# Patient Record
Sex: MALE | Race: WHITE | NOT HISPANIC OR LATINO | URBAN - METROPOLITAN AREA
[De-identification: names, ages, dates, MRNs, and addresses within clinical notes are randomized per-mention and may not be internally consistent; named-entity substitution may affect disease eponyms.]

---

## 2017-02-02 ENCOUNTER — EMERGENCY (EMERGENCY)
Facility: HOSPITAL | Age: 32
LOS: 1 days | Discharge: HOME | End: 2017-02-02
Attending: EMERGENCY MEDICINE | Admitting: EMERGENCY MEDICINE
Payer: COMMERCIAL

## 2017-02-02 VITALS
RESPIRATION RATE: 18 BRPM | OXYGEN SATURATION: 95 % | HEIGHT: 71 IN | HEART RATE: 97 BPM | WEIGHT: 169.98 LBS | TEMPERATURE: 98 F | DIASTOLIC BLOOD PRESSURE: 81 MMHG | SYSTOLIC BLOOD PRESSURE: 148 MMHG

## 2017-02-02 DIAGNOSIS — L03.116 CELLULITIS OF LEFT LOWER LIMB: ICD-10-CM

## 2017-02-02 LAB
ALBUMIN SERPL ELPH-MCNC: 3.9 G/DL — SIGNIFICANT CHANGE UP (ref 3.3–5)
ALP SERPL-CCNC: 57 U/L — SIGNIFICANT CHANGE UP (ref 40–120)
ALT FLD-CCNC: 99 U/L — HIGH (ref 12–78)
ANION GAP SERPL CALC-SCNC: 8 MMOL/L — SIGNIFICANT CHANGE UP (ref 5–17)
AST SERPL-CCNC: 126 U/L — HIGH (ref 15–37)
BASOPHILS # BLD AUTO: 0.1 K/UL — SIGNIFICANT CHANGE UP (ref 0–0.2)
BASOPHILS NFR BLD AUTO: 1 % — SIGNIFICANT CHANGE UP (ref 0–2)
BILIRUB SERPL-MCNC: 0.5 MG/DL — SIGNIFICANT CHANGE UP (ref 0.2–1.2)
BUN SERPL-MCNC: 11 MG/DL — SIGNIFICANT CHANGE UP (ref 7–23)
CALCIUM SERPL-MCNC: 8.4 MG/DL — LOW (ref 8.5–10.1)
CHLORIDE SERPL-SCNC: 108 MMOL/L — SIGNIFICANT CHANGE UP (ref 96–108)
CO2 SERPL-SCNC: 28 MMOL/L — SIGNIFICANT CHANGE UP (ref 22–31)
CREAT SERPL-MCNC: 0.95 MG/DL — SIGNIFICANT CHANGE UP (ref 0.5–1.3)
EOSINOPHIL # BLD AUTO: 0.2 K/UL — SIGNIFICANT CHANGE UP (ref 0–0.5)
EOSINOPHIL NFR BLD AUTO: 3.5 % — SIGNIFICANT CHANGE UP (ref 0–6)
GLUCOSE SERPL-MCNC: 102 MG/DL — HIGH (ref 70–99)
HCT VFR BLD CALC: 42.5 % — SIGNIFICANT CHANGE UP (ref 39–50)
HGB BLD-MCNC: 13.9 G/DL — SIGNIFICANT CHANGE UP (ref 13–17)
LYMPHOCYTES # BLD AUTO: 1.5 K/UL — SIGNIFICANT CHANGE UP (ref 1–3.3)
LYMPHOCYTES # BLD AUTO: 26.6 % — SIGNIFICANT CHANGE UP (ref 13–44)
MCHC RBC-ENTMCNC: 31.9 PG — SIGNIFICANT CHANGE UP (ref 27–34)
MCHC RBC-ENTMCNC: 32.7 GM/DL — SIGNIFICANT CHANGE UP (ref 32–36)
MCV RBC AUTO: 97.6 FL — SIGNIFICANT CHANGE UP (ref 80–100)
MONOCYTES # BLD AUTO: 0.4 K/UL — SIGNIFICANT CHANGE UP (ref 0–0.9)
MONOCYTES NFR BLD AUTO: 7.1 % — SIGNIFICANT CHANGE UP (ref 2–14)
NEUTROPHILS # BLD AUTO: 3.4 K/UL — SIGNIFICANT CHANGE UP (ref 1.8–7.4)
NEUTROPHILS NFR BLD AUTO: 61.8 % — SIGNIFICANT CHANGE UP (ref 43–77)
PLATELET # BLD AUTO: 248 K/UL — SIGNIFICANT CHANGE UP (ref 150–400)
POTASSIUM SERPL-MCNC: 4.5 MMOL/L — SIGNIFICANT CHANGE UP (ref 3.5–5.3)
POTASSIUM SERPL-SCNC: 4.5 MMOL/L — SIGNIFICANT CHANGE UP (ref 3.5–5.3)
PROT SERPL-MCNC: 7.1 GM/DL — SIGNIFICANT CHANGE UP (ref 6–8.3)
RBC # BLD: 4.35 M/UL — SIGNIFICANT CHANGE UP (ref 4.2–5.8)
RBC # FLD: 12.4 % — SIGNIFICANT CHANGE UP (ref 10.3–14.5)
SODIUM SERPL-SCNC: 144 MMOL/L — SIGNIFICANT CHANGE UP (ref 135–145)
WBC # BLD: 5.6 K/UL — SIGNIFICANT CHANGE UP (ref 3.8–10.5)
WBC # FLD AUTO: 5.6 K/UL — SIGNIFICANT CHANGE UP (ref 3.8–10.5)

## 2017-02-02 PROCEDURE — 99284 EMERGENCY DEPT VISIT MOD MDM: CPT

## 2017-02-02 RX ORDER — SODIUM CHLORIDE 9 MG/ML
1000 INJECTION INTRAMUSCULAR; INTRAVENOUS; SUBCUTANEOUS ONCE
Qty: 0 | Refills: 0 | Status: COMPLETED | OUTPATIENT
Start: 2017-02-02 | End: 2017-02-02

## 2017-02-02 RX ADMIN — SODIUM CHLORIDE 1000 MILLILITER(S): 9 INJECTION INTRAMUSCULAR; INTRAVENOUS; SUBCUTANEOUS at 14:17

## 2017-02-02 RX ADMIN — Medication 100 MILLIGRAM(S): at 14:18
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
## 2017-02-02 NOTE — ED STATDOCS - NS ED MD SCRIBE ATTENDING SCRIBE SECTIONS
HISTORY OF PRESENT ILLNESS/DISPOSITION/PAST MEDICAL/SURGICAL/SOCIAL HISTORY/PHYSICAL EXAM/REVIEW OF SYSTEMS

## 2017-02-02 NOTE — ED STATDOCS - SKIN, MLM
Cellulitis of left calf with 3 areas of ulceration, warmth and mild tenderness. No spontaneous discharge, no fluctuance, no induration.

## 2017-02-02 NOTE — ED STATDOCS - ATTENDING CONTRIBUTION TO CARE
Agree with PA progress note    The scribe's documentation has been prepared under my direction and personally reviewed by me in its entirety. I confirm that the note above accurately reflects all work, treatment, procedures, and medical decision making performed by me.
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
## 2017-03-19 ENCOUNTER — INPATIENT (INPATIENT)
Facility: HOSPITAL | Age: 32
LOS: 0 days | Discharge: HOME | End: 2017-03-20
Attending: INTERNAL MEDICINE | Admitting: INTERNAL MEDICINE
Payer: COMMERCIAL

## 2017-03-19 VITALS
HEIGHT: 70 IN | WEIGHT: 175.05 LBS | SYSTOLIC BLOOD PRESSURE: 143 MMHG | DIASTOLIC BLOOD PRESSURE: 81 MMHG | RESPIRATION RATE: 18 BRPM | TEMPERATURE: 98 F | OXYGEN SATURATION: 98 % | HEART RATE: 115 BPM

## 2017-03-19 DIAGNOSIS — R56.9 UNSPECIFIED CONVULSIONS: ICD-10-CM

## 2017-03-19 DIAGNOSIS — Z98.890 OTHER SPECIFIED POSTPROCEDURAL STATES: Chronic | ICD-10-CM

## 2017-03-19 DIAGNOSIS — F11.10 OPIOID ABUSE, UNCOMPLICATED: ICD-10-CM

## 2017-03-19 DIAGNOSIS — L03.116 CELLULITIS OF LEFT LOWER LIMB: ICD-10-CM

## 2017-03-19 DIAGNOSIS — M79.89 OTHER SPECIFIED SOFT TISSUE DISORDERS: ICD-10-CM

## 2017-03-19 LAB
ALBUMIN SERPL ELPH-MCNC: 3.8 G/DL — SIGNIFICANT CHANGE UP (ref 3.3–5)
ALP SERPL-CCNC: 59 U/L — SIGNIFICANT CHANGE UP (ref 40–120)
ALT FLD-CCNC: 89 U/L — HIGH (ref 12–78)
ANION GAP SERPL CALC-SCNC: 8 MMOL/L — SIGNIFICANT CHANGE UP (ref 5–17)
AST SERPL-CCNC: 73 U/L — HIGH (ref 15–37)
BASOPHILS # BLD AUTO: 0.7 K/UL — HIGH (ref 0–0.2)
BASOPHILS NFR BLD AUTO: 1.1 % — SIGNIFICANT CHANGE UP (ref 0–2)
BILIRUB SERPL-MCNC: 0.7 MG/DL — SIGNIFICANT CHANGE UP (ref 0.2–1.2)
BUN SERPL-MCNC: 14 MG/DL — SIGNIFICANT CHANGE UP (ref 7–23)
CALCIUM SERPL-MCNC: 8.4 MG/DL — LOW (ref 8.5–10.1)
CHLORIDE SERPL-SCNC: 109 MMOL/L — HIGH (ref 96–108)
CO2 SERPL-SCNC: 26 MMOL/L — SIGNIFICANT CHANGE UP (ref 22–31)
CREAT SERPL-MCNC: 0.89 MG/DL — SIGNIFICANT CHANGE UP (ref 0.5–1.3)
EOSINOPHIL # BLD AUTO: 0.2 K/UL — SIGNIFICANT CHANGE UP (ref 0–0.5)
EOSINOPHIL NFR BLD AUTO: 3.8 % — SIGNIFICANT CHANGE UP (ref 0–6)
GLUCOSE SERPL-MCNC: 108 MG/DL — HIGH (ref 70–99)
HCT VFR BLD CALC: 45.1 % — SIGNIFICANT CHANGE UP (ref 39–50)
HGB BLD-MCNC: 14.6 G/DL — SIGNIFICANT CHANGE UP (ref 13–17)
LACTATE SERPL-SCNC: 2 MMOL/L — SIGNIFICANT CHANGE UP (ref 0.7–2)
LYMPHOCYTES # BLD AUTO: 1.4 K/UL — SIGNIFICANT CHANGE UP (ref 1–3.3)
LYMPHOCYTES # BLD AUTO: 23.2 % — SIGNIFICANT CHANGE UP (ref 13–44)
MANUAL DIF COMMENT BLD-IMP: SIGNIFICANT CHANGE UP
MCHC RBC-ENTMCNC: 31.2 PG — SIGNIFICANT CHANGE UP (ref 27–34)
MCHC RBC-ENTMCNC: 32.3 GM/DL — SIGNIFICANT CHANGE UP (ref 32–36)
MCV RBC AUTO: 96.6 FL — SIGNIFICANT CHANGE UP (ref 80–100)
MONOCYTES # BLD AUTO: 0.4 K/UL — SIGNIFICANT CHANGE UP (ref 0–0.9)
MONOCYTES NFR BLD AUTO: 6.3 % — SIGNIFICANT CHANGE UP (ref 2–14)
NEUTROPHILS # BLD AUTO: 3.9 K/UL — SIGNIFICANT CHANGE UP (ref 1.8–7.4)
NEUTROPHILS NFR BLD AUTO: 65.6 % — SIGNIFICANT CHANGE UP (ref 43–77)
PLAT MORPH BLD: NORMAL — SIGNIFICANT CHANGE UP
PLATELET # BLD AUTO: 57 K/UL — LOW (ref 150–400)
POTASSIUM SERPL-MCNC: 4.3 MMOL/L — SIGNIFICANT CHANGE UP (ref 3.5–5.3)
POTASSIUM SERPL-SCNC: 4.3 MMOL/L — SIGNIFICANT CHANGE UP (ref 3.5–5.3)
PROT SERPL-MCNC: 7 GM/DL — SIGNIFICANT CHANGE UP (ref 6–8.3)
RBC # BLD: 4.67 M/UL — SIGNIFICANT CHANGE UP (ref 4.2–5.8)
RBC # FLD: 12 % — SIGNIFICANT CHANGE UP (ref 10.3–14.5)
RBC BLD AUTO: NORMAL — SIGNIFICANT CHANGE UP
SODIUM SERPL-SCNC: 143 MMOL/L — SIGNIFICANT CHANGE UP (ref 135–145)
WBC # BLD: 6 K/UL — SIGNIFICANT CHANGE UP (ref 3.8–10.5)
WBC # FLD AUTO: 6 K/UL — SIGNIFICANT CHANGE UP (ref 3.8–10.5)

## 2017-03-19 PROCEDURE — 71020: CPT | Mod: 26

## 2017-03-19 PROCEDURE — 93971 EXTREMITY STUDY: CPT | Mod: 26,LT

## 2017-03-19 PROCEDURE — 99285 EMERGENCY DEPT VISIT HI MDM: CPT

## 2017-03-19 PROCEDURE — 93010 ELECTROCARDIOGRAM REPORT: CPT

## 2017-03-19 RX ORDER — VANCOMYCIN HCL 1 G
1000 VIAL (EA) INTRAVENOUS EVERY 12 HOURS
Qty: 0 | Refills: 0 | Status: DISCONTINUED | OUTPATIENT
Start: 2017-03-19 | End: 2017-03-20

## 2017-03-19 RX ORDER — FOLIC ACID 0.8 MG
1 TABLET ORAL DAILY
Qty: 0 | Refills: 0 | Status: DISCONTINUED | OUTPATIENT
Start: 2017-03-19 | End: 2017-03-20

## 2017-03-19 RX ORDER — BUPRENORPHINE AND NALOXONE 2; .5 MG/1; MG/1
0 TABLET SUBLINGUAL
Qty: 0 | Refills: 0 | COMMUNITY

## 2017-03-19 RX ORDER — SODIUM CHLORIDE 9 MG/ML
1000 INJECTION INTRAMUSCULAR; INTRAVENOUS; SUBCUTANEOUS ONCE
Qty: 0 | Refills: 0 | Status: COMPLETED | OUTPATIENT
Start: 2017-03-19 | End: 2017-03-19

## 2017-03-19 RX ORDER — ZONISAMIDE 100 MG
400 CAPSULE ORAL AT BEDTIME
Qty: 0 | Refills: 0 | Status: DISCONTINUED | OUTPATIENT
Start: 2017-03-19 | End: 2017-03-20

## 2017-03-19 RX ORDER — TETANUS TOXOID, REDUCED DIPHTHERIA TOXOID AND ACELLULAR PERTUSSIS VACCINE, ADSORBED 5; 2.5; 8; 8; 2.5 [IU]/.5ML; [IU]/.5ML; UG/.5ML; UG/.5ML; UG/.5ML
0.5 SUSPENSION INTRAMUSCULAR ONCE
Qty: 0 | Refills: 0 | Status: COMPLETED | OUTPATIENT
Start: 2017-03-19 | End: 2017-03-19

## 2017-03-19 RX ORDER — BUPRENORPHINE AND NALOXONE 2; .5 MG/1; MG/1
1 TABLET SUBLINGUAL DAILY
Qty: 0 | Refills: 0 | Status: DISCONTINUED | OUTPATIENT
Start: 2017-03-19 | End: 2017-03-20

## 2017-03-19 RX ORDER — ACETAMINOPHEN 500 MG
650 TABLET ORAL EVERY 6 HOURS
Qty: 0 | Refills: 0 | Status: DISCONTINUED | OUTPATIENT
Start: 2017-03-19 | End: 2017-03-20

## 2017-03-19 RX ORDER — ZONISAMIDE 100 MG
0 CAPSULE ORAL
Qty: 0 | Refills: 0 | COMMUNITY

## 2017-03-19 RX ORDER — VANCOMYCIN HCL 1 G
1000 VIAL (EA) INTRAVENOUS ONCE
Qty: 0 | Refills: 0 | Status: COMPLETED | OUTPATIENT
Start: 2017-03-19 | End: 2017-03-19

## 2017-03-19 RX ORDER — HEPARIN SODIUM 5000 [USP'U]/ML
5000 INJECTION INTRAVENOUS; SUBCUTANEOUS EVERY 12 HOURS
Qty: 0 | Refills: 0 | Status: DISCONTINUED | OUTPATIENT
Start: 2017-03-19 | End: 2017-03-20

## 2017-03-19 RX ORDER — PIPERACILLIN AND TAZOBACTAM 4; .5 G/20ML; G/20ML
3.38 INJECTION, POWDER, LYOPHILIZED, FOR SOLUTION INTRAVENOUS ONCE
Qty: 0 | Refills: 0 | Status: COMPLETED | OUTPATIENT
Start: 2017-03-19 | End: 2017-03-19

## 2017-03-19 RX ORDER — ONDANSETRON 8 MG/1
4 TABLET, FILM COATED ORAL EVERY 6 HOURS
Qty: 0 | Refills: 0 | Status: DISCONTINUED | OUTPATIENT
Start: 2017-03-19 | End: 2017-03-20

## 2017-03-19 RX ADMIN — SODIUM CHLORIDE 1000 MILLILITER(S): 9 INJECTION INTRAMUSCULAR; INTRAVENOUS; SUBCUTANEOUS at 13:30

## 2017-03-19 RX ADMIN — PIPERACILLIN AND TAZOBACTAM 200 GRAM(S): 4; .5 INJECTION, POWDER, LYOPHILIZED, FOR SOLUTION INTRAVENOUS at 14:30

## 2017-03-19 RX ADMIN — Medication 250 MILLIGRAM(S): at 15:30

## 2017-03-19 RX ADMIN — Medication 400 MILLIGRAM(S): at 22:24

## 2017-03-19 RX ADMIN — TETANUS TOXOID, REDUCED DIPHTHERIA TOXOID AND ACELLULAR PERTUSSIS VACCINE, ADSORBED 0.5 MILLILITER(S): 5; 2.5; 8; 8; 2.5 SUSPENSION INTRAMUSCULAR at 16:05

## 2017-03-19 RX ADMIN — HEPARIN SODIUM 5000 UNIT(S): 5000 INJECTION INTRAVENOUS; SUBCUTANEOUS at 22:25

## 2017-03-19 NOTE — ED STATDOCS - NS ED MD SCRIBE ATTENDING SCRIBE SECTIONS
REVIEW OF SYSTEMS/DISPOSITION/PAST MEDICAL/SURGICAL/SOCIAL HISTORY/RESULTS/HISTORY OF PRESENT ILLNESS/PROGRESS NOTE/PHYSICAL EXAM

## 2017-03-19 NOTE — ED STATDOCS - DETAILS:
Dr. Menon: I performed the initial face to face bedside interview with this patient regarding history of present illness, review of symptoms and past medical, social and family history.  I completed an independent physical examination.  I was the initial provider who evaluated this patient.  The history, review of symptoms and examination was documented by the scribe in my presence and I attest to the accuracy of the documentation.  I have signed out the follow up of any pending tests (i.e. labs, radiological studies) to the ACP.  I have discussed the patient’s plan of care and disposition with the ACP.

## 2017-03-19 NOTE — H&P ADULT - ASSESSMENT
30yo M benign brain lesion s/p resection c/b recurrent seizures, MRSA infection of L knee, IVDA on Suboxone admitted with nonresolving cellulitis s/p PO antibiotics

## 2017-03-19 NOTE — PROVIDER CONTACT NOTE (OTHER) - SITUATION
patient states he drinks vodka daily 4-5 drinks/day. states he has previously received ativan during prior hospitalizations.

## 2017-03-19 NOTE — H&P ADULT - NSHPPHYSICALEXAM_GEN_ALL_CORE
Vital Signs Last 24 Hrs  T(C): 36.7, Max: 36.7 (03-19 @ 12:03)  T(F): 98.1, Max: 98.1 (03-19 @ 12:03)  HR: 115 (115 - 115)  BP: 143/81 (143/81 - 143/81)  BP(mean): --  RR: 18 (18 - 18)  SpO2: 98% (98% - 98%)    Gen: AAOx3, NAD, peculiar affect  HEENT: NCAT, EOMI  Neck: Supple  CV: nml S1S2, RRR  Lungs: CTABL  Abd: Soft, NT, ND, BS+  Ext: multiple excoriations anterior/lateral/posterior aspect of left calf, no purulent drainage, dry; warmth, erythema, nonpitting edema of left ankle  Neuro: Non focal

## 2017-03-19 NOTE — CHART NOTE - NSCHARTNOTEFT_GEN_A_CORE
Notified by RN that the patient drinks 4 vodka daily currently he is not scoring on CIWA protcol.Will place on Low dose CIWA protocol now.    D/W with Dr Hay

## 2017-03-19 NOTE — H&P ADULT - PROBLEM SELECTOR PLAN 2
- ambulating without difficulty, nontender to palpation  - edema associated with cellulitis  - doppler negative for DVT  - check ankle XR

## 2017-03-19 NOTE — H&P ADULT - NSHPLABSRESULTS_GEN_ALL_CORE
14.6   6.0   )-----------( 57       ( 19 Mar 2017 14:24 )             45.1     Lactate, Blood: 2.0 mmol/L (03-19 @ 14:24)

## 2017-03-19 NOTE — H&P ADULT - NSHPREVIEWOFSYSTEMS_GEN_ALL_CORE
(-)Fever, chills, cough, chest pain, sob, headache, dizziness, palpitations, abd pain, n/v/d, difficulty walking  (+) left ankle edema, leg itching, tenderness

## 2017-03-19 NOTE — H&P ADULT - PROBLEM SELECTOR PLAN 4
- seizure disorder after craniectomy for benign brain lesion 5 years ago  - c/w zonisamide 400mg at bedtime
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
## 2017-03-19 NOTE — H&P ADULT - PROBLEM SELECTOR PLAN 1
- initial improvement on clindamycin, but now worsening  - vancomycin IV due to MRSA RF's  - nondiabetic, no significant RF's for GN coverage  - ID consultation  - SW consultation as will likely need long term IV Abx
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
## 2017-03-20 VITALS
TEMPERATURE: 98 F | SYSTOLIC BLOOD PRESSURE: 126 MMHG | DIASTOLIC BLOOD PRESSURE: 85 MMHG | HEART RATE: 62 BPM | RESPIRATION RATE: 20 BRPM

## 2017-03-20 LAB
ALBUMIN SERPL ELPH-MCNC: 3.5 G/DL — SIGNIFICANT CHANGE UP (ref 3.3–5)
ALP SERPL-CCNC: 56 U/L — SIGNIFICANT CHANGE UP (ref 40–120)
ALT FLD-CCNC: 75 U/L — SIGNIFICANT CHANGE UP (ref 12–78)
ANION GAP SERPL CALC-SCNC: 9 MMOL/L — SIGNIFICANT CHANGE UP (ref 5–17)
AST SERPL-CCNC: 54 U/L — HIGH (ref 15–37)
BILIRUB DIRECT SERPL-MCNC: 0.3 MG/DL — HIGH (ref 0–0.2)
BILIRUB INDIRECT FLD-MCNC: 1.2 MG/DL — HIGH (ref 0.2–1)
BILIRUB SERPL-MCNC: 1.5 MG/DL — HIGH (ref 0.2–1.2)
BUN SERPL-MCNC: 12 MG/DL — SIGNIFICANT CHANGE UP (ref 7–23)
CALCIUM SERPL-MCNC: 8.6 MG/DL — SIGNIFICANT CHANGE UP (ref 8.5–10.1)
CHLORIDE SERPL-SCNC: 108 MMOL/L — SIGNIFICANT CHANGE UP (ref 96–108)
CO2 SERPL-SCNC: 25 MMOL/L — SIGNIFICANT CHANGE UP (ref 22–31)
CREAT SERPL-MCNC: 0.76 MG/DL — SIGNIFICANT CHANGE UP (ref 0.5–1.3)
GLUCOSE SERPL-MCNC: 103 MG/DL — HIGH (ref 70–99)
MAGNESIUM SERPL-MCNC: 1.7 MG/DL — LOW (ref 1.8–2.4)
PHOSPHATE SERPL-MCNC: 2.2 MG/DL — LOW (ref 2.5–4.5)
POTASSIUM SERPL-MCNC: 4.2 MMOL/L — SIGNIFICANT CHANGE UP (ref 3.5–5.3)
POTASSIUM SERPL-SCNC: 4.2 MMOL/L — SIGNIFICANT CHANGE UP (ref 3.5–5.3)
PROT SERPL-MCNC: 6.7 GM/DL — SIGNIFICANT CHANGE UP (ref 6–8.3)
SODIUM SERPL-SCNC: 142 MMOL/L — SIGNIFICANT CHANGE UP (ref 135–145)

## 2017-03-20 PROCEDURE — 93010 ELECTROCARDIOGRAM REPORT: CPT

## 2017-03-20 RX ORDER — SODIUM,POTASSIUM PHOSPHATES 278-250MG
1 POWDER IN PACKET (EA) ORAL
Qty: 0 | Refills: 0 | Status: DISCONTINUED | OUTPATIENT
Start: 2017-03-20 | End: 2017-03-20

## 2017-03-20 RX ORDER — AMPICILLIN SODIUM AND SULBACTAM SODIUM 250; 125 MG/ML; MG/ML
3 INJECTION, POWDER, FOR SUSPENSION INTRAMUSCULAR; INTRAVENOUS EVERY 6 HOURS
Qty: 0 | Refills: 0 | Status: DISCONTINUED | OUTPATIENT
Start: 2017-03-20 | End: 2017-03-20

## 2017-03-20 RX ORDER — BUPRENORPHINE AND NALOXONE 2; .5 MG/1; MG/1
1 TABLET SUBLINGUAL DAILY
Qty: 0 | Refills: 0 | Status: DISCONTINUED | OUTPATIENT
Start: 2017-03-20 | End: 2017-03-20

## 2017-03-20 RX ORDER — AMPICILLIN SODIUM AND SULBACTAM SODIUM 250; 125 MG/ML; MG/ML
INJECTION, POWDER, FOR SUSPENSION INTRAMUSCULAR; INTRAVENOUS
Qty: 0 | Refills: 0 | Status: DISCONTINUED | OUTPATIENT
Start: 2017-03-20 | End: 2017-03-20

## 2017-03-20 RX ORDER — MAGNESIUM OXIDE 400 MG ORAL TABLET 241.3 MG
1 TABLET ORAL
Qty: 12 | Refills: 0 | OUTPATIENT
Start: 2017-03-20 | End: 2017-03-24

## 2017-03-20 RX ORDER — AMPICILLIN SODIUM AND SULBACTAM SODIUM 250; 125 MG/ML; MG/ML
3 INJECTION, POWDER, FOR SUSPENSION INTRAMUSCULAR; INTRAVENOUS ONCE
Qty: 0 | Refills: 0 | Status: COMPLETED | OUTPATIENT
Start: 2017-03-20 | End: 2017-03-20

## 2017-03-20 RX ORDER — AZTREONAM 2 G
2 VIAL (EA) INJECTION
Qty: 28 | Refills: 0 | OUTPATIENT
Start: 2017-03-20 | End: 2017-03-27

## 2017-03-20 RX ORDER — MAGNESIUM OXIDE 400 MG ORAL TABLET 241.3 MG
400 TABLET ORAL
Qty: 0 | Refills: 0 | Status: DISCONTINUED | OUTPATIENT
Start: 2017-03-20 | End: 2017-03-20

## 2017-03-20 RX ADMIN — BUPRENORPHINE AND NALOXONE 1 FILM(S): 2; .5 TABLET SUBLINGUAL at 12:49

## 2017-03-20 RX ADMIN — Medication 1 TABLET(S): at 11:58

## 2017-03-20 RX ADMIN — BUPRENORPHINE AND NALOXONE 1 FILM(S): 2; .5 TABLET SUBLINGUAL at 12:50

## 2017-03-20 RX ADMIN — Medication 1 MILLIGRAM(S): at 11:58

## 2017-03-20 RX ADMIN — Medication 250 MILLIGRAM(S): at 06:38

## 2017-03-20 RX ADMIN — AMPICILLIN SODIUM AND SULBACTAM SODIUM 200 GRAM(S): 250; 125 INJECTION, POWDER, FOR SUSPENSION INTRAMUSCULAR; INTRAVENOUS at 16:31

## 2017-03-20 RX ADMIN — AMPICILLIN SODIUM AND SULBACTAM SODIUM 200 GRAM(S): 250; 125 INJECTION, POWDER, FOR SUSPENSION INTRAMUSCULAR; INTRAVENOUS at 11:58

## 2017-03-20 RX ADMIN — MAGNESIUM OXIDE 400 MG ORAL TABLET 400 MILLIGRAM(S): 241.3 TABLET ORAL at 11:58

## 2017-03-20 RX ADMIN — MAGNESIUM OXIDE 400 MG ORAL TABLET 400 MILLIGRAM(S): 241.3 TABLET ORAL at 16:35

## 2017-03-20 RX ADMIN — HEPARIN SODIUM 5000 UNIT(S): 5000 INJECTION INTRAVENOUS; SUBCUTANEOUS at 05:55

## 2017-03-20 RX ADMIN — Medication 1 TABLET(S): at 16:35

## 2017-03-20 RX ADMIN — Medication 250 MILLIGRAM(S): at 17:22
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
## 2017-03-20 NOTE — DISCHARGE NOTE ADULT - NS AS ACTIVITY OBS
Bathing allowed/Walking-Outdoors allowed/Driving allowed/Return to Work/School allowed/Showering allowed/Stairs allowed/Walking-Indoors allowed/Sex allowed

## 2017-03-20 NOTE — DISCHARGE NOTE ADULT - PATIENT PORTAL LINK FT
“You can access the Follow[employer] Patient Portal, offered by [facility], by registering with the following website: [email or web address]”

## 2017-03-20 NOTE — DISCHARGE NOTE ADULT - HOSPITAL COURSE
30yo M w/ PMH of benign brain lesion s/p resection , recurrent seizures (takes zonisamide) , hx of IVDA on Suboxone, ongoing ETOH abuse and dependence (daily vodka ) ,  hx of MRSA infection of L knee 2 months ago, recent cellulitis of the left leg aprox 4 weeks ago was given Clindamycin for 2 weeks now w/ recurrent cellulitis of the left leg.   Was started on Vancomycin with rapid improvement in his condition. after 24 hours of therapy, the erythema below the knee is completely resolved. Some mild edema around the ankle persists. Patient is advised to keep left leg elevated  when not ambulating.  Patient seen by Infectious disease whom concur with the findings of resolution of the left leg cellulitis. Patient will be discharged on oral bactrim DS for 7 days. Advised to f/u with outpatiet pcp w/ in one week. Educated on ETOH. He currently has no signs of withdrawl and is alert and oriented and verbalizes understanding of the plan.     Other issues:   1- mild hypomagnesemia- supplemented with oral max ox , patient will continue for another 4 days at home. prescription sent.    2-mild hypophosphatemia- supplemented   3-Transaminitis- likley secondary to his ongoing ETOH abuse. Educated.     Patient is currently alert and oriented, has no complaints and is medically cleared for discharge.      T(F): 97.6, Max: 98.5 (03-19 @ 19:35)  HR: 62 (56 - 65)  BP: 126/85 (95/66 - 134/82)  RR: 20 (15 - 20)  SpO2: 99% (99% - 100%)  Wt(kg): --      Physical exam:    HEENT:   pupils equal and reactive, EOMI, no oropharyngeal lesions, erythema, exudates, oral thrush    NECK:   supple, no carotid bruits, no palpable lymph nodes, no thyromegaly    CV:  +S1, +S2, regular, no murmurs or rubs    RESP:   lungs clear to auscultation bilaterally, no wheezing, rales, rhonchi, good air entry bilaterally    BREAST:  not examined    GI:  abdomen soft, non-tender, non-distended, normal BS, no bruits, no abdominal masses, no palpable masses    RECTAL:  not examined    :  not examined    MSK:   normal muscle tone, no atrophy, no rigidity, no contractions    EXT:   no clubbing, no cyanosis,  no calf pain, swelling or erythema; left ankle has mild edema.    VASCULAR:  pulses equal and symmetric in the upper and lower extremities    NEURO:  AAOX3, no focal neurological deficits, follows all commands, able to move extremities spontaneously    SKIN:  no ulcers, lesions or rashes

## 2017-03-20 NOTE — DISCHARGE NOTE ADULT - MEDICATION SUMMARY - MEDICATIONS TO TAKE
I will START or STAY ON the medications listed below when I get home from the hospital:    Suboxone 8 mg-2 mg sublingual film  --  under tongue   -- Indication: For Heroin abuse    zonisamide  --  by mouth   -- Indication: For Seizures    magnesium oxide 400 mg (241.3 mg elemental magnesium) oral tablet  -- 1 tab(s) by mouth 3 times a day (with meals)  -- Indication: For Hypomagnesemia    Bactrim  mg-160 mg oral tablet  -- 2 tab(s) by mouth 2 times a day  -- Avoid prolonged or excessive exposure to direct and/or artificial sunlight while taking this medication.  Finish all this medication unless otherwise directed by prescriber.  Medication should be taken with plenty of water.    -- Indication: For Cellulitis of leg, left    Multiple Vitamins oral tablet  -- 1 tab(s) by mouth once a day  -- Indication: For ETOH abuse and dependence

## 2017-03-20 NOTE — DISCHARGE NOTE ADULT - PLAN OF CARE
Continue with oral antibiotics for  7 days, Bactrim. Prescription sent to pharmacy. f/upcp w/ in one week. keep left leg elevated when not ambulatory educated educated on etoh; c/w daily supplemental vitamins Iliana may continue with his suboxone with close f/u outpatient suboxone center.

## 2017-03-20 NOTE — DISCHARGE NOTE ADULT - MEDICATION SUMMARY - MEDICATIONS TO STOP TAKING
I will STOP taking the medications listed below when I get home from the hospital:    clindamycin 300 mg oral capsule  -- 1 cap(s) by mouth every 6 hours  -- Finish all this medication unless otherwise directed by prescriber.  Medication should be taken with plenty of water.
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
## 2017-03-20 NOTE — DISCHARGE NOTE ADULT - CARE PLAN
Principal Discharge DX:	Cellulitis of leg, left  Goal:	Continue with oral antibiotics for  7 days, Bactrim. Prescription sent to pharmacy. f/upcp w/ in one week.  Instructions for follow-up, activity and diet:	keep left leg elevated when not ambulatory  Secondary Diagnosis:	ETOH abuse  Goal:	educated  Instructions for follow-up, activity and diet:	educated on etoh; c/w daily supplemental vitamins  Secondary Diagnosis:	Alcohol dependence with unspecified alcohol-induced disorder  Goal:	educated  Instructions for follow-up, activity and diet:	educated on etoh; c/w daily supplemental vitamins  Secondary Diagnosis:	Heroin abuse  Goal:	Iliana may continue with his suboxone with close f/u outpatient suboxone center.

## 2017-03-23 DIAGNOSIS — E83.39 OTHER DISORDERS OF PHOSPHORUS METABOLISM: ICD-10-CM

## 2017-03-23 DIAGNOSIS — Z86.14 PERSONAL HISTORY OF METHICILLIN RESISTANT STAPHYLOCOCCUS AUREUS INFECTION: ICD-10-CM

## 2017-03-23 DIAGNOSIS — Z28.21 IMMUNIZATION NOT CARRIED OUT BECAUSE OF PATIENT REFUSAL: ICD-10-CM

## 2017-03-23 DIAGNOSIS — F10.29 ALCOHOL DEPENDENCE WITH UNSPECIFIED ALCOHOL-INDUCED DISORDER: ICD-10-CM

## 2017-03-23 DIAGNOSIS — L03.116 CELLULITIS OF LEFT LOWER LIMB: ICD-10-CM

## 2017-03-23 DIAGNOSIS — R74.0 NONSPECIFIC ELEVATION OF LEVELS OF TRANSAMINASE AND LACTIC ACID DEHYDROGENASE [LDH]: ICD-10-CM

## 2017-03-23 DIAGNOSIS — F11.10 OPIOID ABUSE, UNCOMPLICATED: ICD-10-CM

## 2017-03-23 DIAGNOSIS — G40.909 EPILEPSY, UNSPECIFIED, NOT INTRACTABLE, WITHOUT STATUS EPILEPTICUS: ICD-10-CM

## 2017-03-23 DIAGNOSIS — Z98.890 OTHER SPECIFIED POSTPROCEDURAL STATES: ICD-10-CM

## 2017-03-23 DIAGNOSIS — R60.0 LOCALIZED EDEMA: ICD-10-CM

## 2017-03-23 DIAGNOSIS — F17.210 NICOTINE DEPENDENCE, CIGARETTES, UNCOMPLICATED: ICD-10-CM

## 2017-03-23 DIAGNOSIS — E83.42 HYPOMAGNESEMIA: ICD-10-CM

## 2017-03-23 DIAGNOSIS — Z86.011 PERSONAL HISTORY OF BENIGN NEOPLASM OF THE BRAIN: ICD-10-CM

## 2017-03-24 LAB
CULTURE RESULTS: SIGNIFICANT CHANGE UP
CULTURE RESULTS: SIGNIFICANT CHANGE UP
SPECIMEN SOURCE: SIGNIFICANT CHANGE UP
SPECIMEN SOURCE: SIGNIFICANT CHANGE UP

## 2018-10-01 ENCOUNTER — EMERGENCY (EMERGENCY)
Facility: HOSPITAL | Age: 33
LOS: 1 days | End: 2018-10-01
Payer: MEDICAID

## 2018-10-01 DIAGNOSIS — Z98.890 OTHER SPECIFIED POSTPROCEDURAL STATES: Chronic | ICD-10-CM

## 2018-10-01 PROCEDURE — 99284 EMERGENCY DEPT VISIT MOD MDM: CPT

## 2018-10-03 ENCOUNTER — EMERGENCY (EMERGENCY)
Facility: HOSPITAL | Age: 33
LOS: 1 days | End: 2018-10-03
Payer: MEDICAID

## 2018-10-03 DIAGNOSIS — Z98.890 OTHER SPECIFIED POSTPROCEDURAL STATES: Chronic | ICD-10-CM

## 2018-10-03 PROCEDURE — 70450 CT HEAD/BRAIN W/O DYE: CPT | Mod: 26

## 2018-10-03 PROCEDURE — 99284 EMERGENCY DEPT VISIT MOD MDM: CPT

## 2018-12-13 NOTE — ED ADULT TRIAGE NOTE - WEIGHT IN KG
I will START or STAY ON the medications listed below when I get home from the hospital:    acetaminophen 325 mg oral tablet  -- 2 tab(s) by mouth every 6 hours, As needed, Temp greater or equal to 38C (100.4F), Mild Pain (1 - 3)  -- Indication: For Fibroids    ibuprofen 600 mg oral tablet  -- 1 tab(s) by mouth every 6 hours  -- Indication: For Fibroids    oxycodone-acetaminophen 5 mg-325 mg oral tablet  -- 1 tab(s) by mouth every 4 hours, As Needed -Moderate Pain (4 - 6) MDD:6 tabs per day  -- Indication: For Fibroids    docusate sodium 100 mg oral capsule  -- 1 cap(s) by mouth 2 times a day  -- Indication: For Fibroids 79.4

## 2022-01-23 PROBLEM — F11.10 OPIOID ABUSE, UNCOMPLICATED: Chronic | Status: ACTIVE | Noted: 2017-02-09

## 2022-01-23 PROBLEM — R56.9 UNSPECIFIED CONVULSIONS: Chronic | Status: ACTIVE | Noted: 2017-03-19

## 2022-01-23 PROBLEM — D33.2 BENIGN NEOPLASM OF BRAIN, UNSPECIFIED: Chronic | Status: ACTIVE | Noted: 2017-03-19

## 2022-01-23 PROBLEM — B95.8 UNSPECIFIED STAPHYLOCOCCUS AS THE CAUSE OF DISEASES CLASSIFIED ELSEWHERE: Chronic | Status: ACTIVE | Noted: 2017-02-09

## 2022-09-18 ENCOUNTER — EMERGENCY (EMERGENCY)
Facility: HOSPITAL | Age: 37
LOS: 1 days | Discharge: HOME | End: 2022-09-18
Attending: EMERGENCY MEDICINE
Payer: MEDICAID

## 2022-09-18 VITALS
SYSTOLIC BLOOD PRESSURE: 116 MMHG | TEMPERATURE: 98 F | HEART RATE: 85 BPM | WEIGHT: 160.06 LBS | RESPIRATION RATE: 16 BRPM | OXYGEN SATURATION: 95 % | DIASTOLIC BLOOD PRESSURE: 76 MMHG

## 2022-09-18 DIAGNOSIS — Z98.890 OTHER SPECIFIED POSTPROCEDURAL STATES: Chronic | ICD-10-CM

## 2022-09-18 LAB
ANION GAP SERPL CALC-SCNC: 15 MMOL/L — SIGNIFICANT CHANGE UP (ref 5–17)
BUN SERPL-MCNC: 8.6 MG/DL — SIGNIFICANT CHANGE UP (ref 8–20)
CALCIUM SERPL-MCNC: 9.4 MG/DL — SIGNIFICANT CHANGE UP (ref 8.4–10.5)
CHLORIDE SERPL-SCNC: 96 MMOL/L — LOW (ref 98–107)
CO2 SERPL-SCNC: 24 MMOL/L — SIGNIFICANT CHANGE UP (ref 22–29)
CREAT SERPL-MCNC: 0.88 MG/DL — SIGNIFICANT CHANGE UP (ref 0.5–1.3)
EGFR: 112 ML/MIN/1.73M2 — SIGNIFICANT CHANGE UP
GLUCOSE SERPL-MCNC: 123 MG/DL — HIGH (ref 70–99)
HCT VFR BLD CALC: 47 % — SIGNIFICANT CHANGE UP (ref 39–50)
HGB BLD-MCNC: 16.5 G/DL — SIGNIFICANT CHANGE UP (ref 13–17)
MCHC RBC-ENTMCNC: 31.9 PG — SIGNIFICANT CHANGE UP (ref 27–34)
MCHC RBC-ENTMCNC: 35.1 GM/DL — SIGNIFICANT CHANGE UP (ref 32–36)
MCV RBC AUTO: 90.7 FL — SIGNIFICANT CHANGE UP (ref 80–100)
PLATELET # BLD AUTO: 234 K/UL — SIGNIFICANT CHANGE UP (ref 150–400)
POTASSIUM SERPL-MCNC: 4.1 MMOL/L — SIGNIFICANT CHANGE UP (ref 3.5–5.3)
POTASSIUM SERPL-SCNC: 4.1 MMOL/L — SIGNIFICANT CHANGE UP (ref 3.5–5.3)
RBC # BLD: 5.18 M/UL — SIGNIFICANT CHANGE UP (ref 4.2–5.8)
RBC # FLD: 12.2 % — SIGNIFICANT CHANGE UP (ref 10.3–14.5)
SODIUM SERPL-SCNC: 135 MMOL/L — SIGNIFICANT CHANGE UP (ref 135–145)
WBC # BLD: 6.19 K/UL — SIGNIFICANT CHANGE UP (ref 3.8–10.5)
WBC # FLD AUTO: 6.19 K/UL — SIGNIFICANT CHANGE UP (ref 3.8–10.5)

## 2022-09-18 PROCEDURE — 99283 EMERGENCY DEPT VISIT LOW MDM: CPT

## 2022-09-18 PROCEDURE — 36415 COLL VENOUS BLD VENIPUNCTURE: CPT

## 2022-09-18 PROCEDURE — 85027 COMPLETE CBC AUTOMATED: CPT

## 2022-09-18 PROCEDURE — 99284 EMERGENCY DEPT VISIT MOD MDM: CPT

## 2022-09-18 PROCEDURE — 80048 BASIC METABOLIC PNL TOTAL CA: CPT

## 2022-09-18 RX ORDER — LEVETIRACETAM 250 MG/1
1000 TABLET, FILM COATED ORAL ONCE
Refills: 0 | Status: COMPLETED | OUTPATIENT
Start: 2022-09-18 | End: 2022-09-18

## 2022-09-18 RX ADMIN — Medication 50 MILLIGRAM(S): at 21:10

## 2022-09-18 RX ADMIN — LEVETIRACETAM 1000 MILLIGRAM(S): 250 TABLET, FILM COATED ORAL at 21:10

## 2022-09-18 NOTE — ED PROVIDER NOTE - PHYSICAL EXAMINATION
Gen: Well appearing in NAD  Head: NC/AT, + left lateral tongue abrasion  Neck: trachea midline  Card: regular rate and rhythm  Resp:  CTAB  Abd: soft, non-distended, non-tender  Ext: no deformities  Neuro:  A&O appears non focal, no hand tremors, no tongue fasciculations, normal strength and sensation   Skin:  Warm and dry as visualized  Psych:  Normal affect and mood

## 2022-09-18 NOTE — ED ADULT NURSE NOTE - OBJECTIVE STATEMENT
AOx4. Pt complaining of seizure at home. Pt reports being in rehab for ETOH in which his last drink was monday. Pt states " my last seizure was 5 years ago". Pt denies SOB, chest pain, n/v/d. Respirations even and unlabored. Skin warm to touch. pt educated on plan of care, pt able to successfully teach back plan of care to RN, RN will continue to reeducate pt during hospital stay.

## 2022-09-18 NOTE — ED PROVIDER NOTE - ATTENDING CONTRIBUTION TO CARE
Agusto: I performed a face to face bedside interview with patient regarding history of present illness, review of symptoms and past medical history. I completed an independent physical exam and ordered tests/medications as needed.  I have discussed patient's plan of care with the resident. The resident assisted in  executing the discussed plan. I was available for any questions or issues that may have arose during the execution of the plan of care.

## 2022-09-18 NOTE — ED PROVIDER NOTE - PATIENT PORTAL LINK FT
You can access the FollowMyHealth Patient Portal offered by [facility] by registering at the following website: [email or web address]. By joining [employer]’s FollowMyHealth portal, you will also be able to view your health information using other applications (apps) compatible with our system.

## 2022-09-18 NOTE — ED ADULT TRIAGE NOTE - CHIEF COMPLAINT QUOTE
Patient ANISHA s/p witnessed seizure by his peers while at an AA meeting this evening. Patient is not medicated for seizures as his last one was 5 years ago. Last drink was Monday 9/12/22. Pt offers no complaints.

## 2022-09-18 NOTE — ED PROVIDER NOTE - CARE PROVIDER_API CALL
Darío Guerra)  EEGEpilepsy; Neurology  [address] [place] [address]  Phone: [phone]  Fax: [phone]  Follow Up Time:

## 2022-09-18 NOTE — ED ADULT NURSE NOTE - NSIMPLEMENTINTERV_GEN_ALL_ED
Implemented All Universal Safety Interventions:  [place] to call system. Call bell, personal items and telephone within reach. Instruct patient to call for assistance. Room bathroom lighting operational. Non-slip footwear when patient is off stretcher. Physically safe environment: no spills, clutter or unnecessary equipment. Stretcher in lowest position, wheels locked, appropriate side rails in place.

## 2022-09-18 NOTE — ED PROVIDER NOTE - CLINICAL SUMMARY MEDICAL DECISION MAKING FREE TEXT BOX
Resident Tabatha Humphreys: 36 y/o male with PMHx of etoh abuse, benzo abuse presents to the ED s/p seizure. VSS, benign exam. Labs unremarkable. Librium given due to patient already on taper for alcohol withdrawal at the rehab center. Keppra given for seizure prophylaxis. Discussed continuing keppra outpatient, patient declined at this time. Discussed the importance of outpatient follow-up with the epilepsy clinic, return precautions.

## 2022-09-18 NOTE — ED PROVIDER NOTE - IV ALTEPLASE ADMIN OUTSIDE HIDDEN
Below message received from My Chart Patient Clinical Update:    Problems   This clinical information was not verified.    Medications   This clinical information was not verified, but there are updates pending review:   Reported Medications Start Date Repo show

## 2022-09-18 NOTE — ED ADULT NURSE NOTE - NSSUHOSCREENINGYN_ED_ALL_ED
[address]  Phone: [phone]    No name on file. February 3, 2020     Patient: Wilmar Osuna   YOB: 1960   Date of Visit: 2/3/2020       To Whom It May Concern: It is my medical opinion that Wilmar Osuan may return to work on 2/13/2020 with the following restrictions: lifting/carrying not to exceed 15 lbs. .    If you have any questions or concerns, please don't hesitate to call.     Sincerely,        Electronically signed by Nasim Finley MD on 2/3/2020 at 3:23 PM
Yes - the patient is able to be screened

## 2022-09-18 NOTE — ED PROVIDER NOTE - NSFOLLOWUPINSTRUCTIONS_ED_ALL_ED_FT
1. Return to [facility] to continue your rehab.   2. Follow up with epilepsy (seizure) specialist at the next available appointment.   3. Return to the ED for any new or worsening symptoms.     Seizure, Adult    A seizure is a sudden burst of abnormal electrical activity in the brain. Seizures usually last from 30 seconds to 2 minutes. They can cause many different symptoms.    Usually, seizures are not harmful unless they last a long time.      What are the causes?  Common causes of this condition include:  •Fever or infection.    •Conditions that affect the brain, such as:  •A brain or head injury.  •Bleeding in the brain.  •A tumor.  •Stroke.   •Low blood sugar.   •Conditions that are passed from parent to child (are inherited).    •Problems with substances, such as:  •Having a reaction to a drug or a medicine.  •Suddenly stopping the use of a substance (withdrawal).  •Disorders such as autism or cerebral palsy.      In some cases, the cause may not be known. A person who has repeated seizures over time without a clear cause has a condition called epilepsy.      What increases the risk?  You are more likely to get this condition if you have:  •A family history of epilepsy.   •Had a seizure in the past.  •A history of head injury, lack of oxygen at birth, or strokes.      What are the signs or symptoms?    There are many types of seizures. The symptoms vary depending on the type of seizure you have.    Symptoms during a seizure     •Shaking (convulsions).  •Stiffness in the body.  •Passing out, or losing consciousness.  •Head nodding.  •Staring.   •Not responding to sound or touch.  •Loss of bladder control and bowel control.       Symptoms before a seizure     •Fear.  •Worry (anxiety).  •Feeling like you may vomit.  •Feeling like the room is spinning (vertigo).  •Feeling like you saw or heard something before (déjà vu).  •Odd tastes or smells.  •Changes in how you see. You may see flashing lights or spots.      Symptoms after a seizure   •Confusion.   •Sleepiness.   •Headache.   •Weakness on one side of the body.      How is this treated?  Most seizures will stop on their own in under 5 minutes. In these cases, no treatment is needed. Seizures that last longer than 5 minutes will usually need treatment.     Treatment can include:  •Medicines given through an IV tube.  •Avoiding things that are known to cause your seizures. These can include medicines that you take for another condition.  •Medicines to treat epilepsy.  •Surgery to stop the seizures. This may be needed if medicines do not help.      Follow these instructions at home:    Medicines   •Take over-the-counter and prescription medicines only as told by your doctor.  • Do not eat or drink anything that may keep your medicine from working, such as alcohol.      Activity     • Do not do any activities that would be dangerous if you had another seizure, like driving or swimming. Wait until your doctor says it is safe for you to do them.  •If you live in the [place] ask your local DMV when you can drive.  •Get plenty of rest. Lack of sleep can make seizures more likely to occur.        Teaching others   Teach friends and family what to do when you have a seizure.     They should:  •Lay you on the ground.  •Protect your head and body.  •Loosen any tight clothing around your neck.  •Turn you on your side.  •Not hold you down.  •Not put anything into your mouth.  •Know whether or not you need emergency care.     For example, they should get help right away if:  •You have a seizure that lasts longer than 5 minutes.  •You have several seizures that follow one another.  •Stay with you until you are better.      General instructions   •Contact your doctor each time you have a seizure.  •Avoid anything that gives you seizures.  •Keep a seizure diary.     Write down:  •What you think caused each seizure.  •What you remember about each seizure.  •Keep all follow-up visits as told by your doctor. This is important.      Contact a doctor if:  •You have another seizure.  •You have seizures more often.  •There is any change in what happens during your seizures.  •You keep having seizures with treatment.  •You have symptoms of being sick or having an infection. This may make a seizure more likely to happen.        Get help right away if:  •You have a seizure that:  •Lasts longer than 5 minutes.  •Is different than seizures you had before.  •Makes it harder to breathe.  •Happens after you hurt your head.    •You have any of these symptoms after a seizure:  •Not being able to speak.  •Not being able to use a part of your body.  •Confusion.  •A bad headache.  •You have two or more seizures in a row.  •You do not wake up right after a seizure.  •You get hurt during a seizure.      These symptoms may be an emergency. Do not wait to see if the symptoms will go away. Get medical help right away. Call your local emergency services (911 in the [place] Do not drive yourself to the hospital.       Summary  •A seizure is a sudden burst of abnormal electrical activity in the brain. Seizures usually last from 30 seconds to 2 minutes. They are not harmful unless they last a long time.  •Causes of seizures include illnesses, medicines, conditions that are passed from parent to child, injury to your head, strokes, drug abuse, or growths or tumors.  • Do not eat or drink anything that may keep your medicine from working, such as alcohol.  •Teach friends and family what to do when you have a seizure.  •Contact your doctor each time you have a seizure.      This information is not intended to replace advice given to you by your health care provider. Make sure you discuss any questions you have with your health care provider.

## 2022-09-18 NOTE — ED PROVIDER NOTE - OBJECTIVE STATEMENT
38 y/o male with PMHx of etoh abuse, benzo abuse presents to the ED s/p seizure. Pt at [facility] for rehab is on withdrawal taper, states is on Clonidine 10 mg TID, last dose at 18:00 which he states is later than normal, also notes did not sleep well last night. Pt states he last had a seizure 5 years ago and states past seizures have been related to substance withdrawal. Pt states he saw neuro in past was on keppra but stopped following up. Pt denies abdominal pain, N/V, auditory or visual hallucinations, formication. 36 y/o male with PMHx of etoh abuse, benzo abuse presents to the ED s/p seizure. Pt at [facility] for rehab is on withdrawal taper, states is on Clonidine 10 mg TID, last dose at 18:00 which he states is later than normal, also notes did not sleep well last night. Pt states he last had a seizure 5 years ago and states past seizures have been related to substance withdrawal. Pt states he saw neuro in past was on keppra but stopped following up. Pt denies abdominal pain, N/V, auditory or visual hallucinations, formication. Last drink a week ago, denies other drugs.

## 2023-01-02 NOTE — ED ADULT NURSE NOTE - MODE OF DISCHARGE
PT to be discharged and [facility] to find placement and connect with pt from home         Andria Castillo RN  01/02/23 0039 Ambulatory

## 2024-06-08 ENCOUNTER — EMERGENCY (EMERGENCY)
Facility: HOSPITAL | Age: 39
LOS: 1 days | Discharge: HOME | End: 2024-06-08
Attending: EMERGENCY MEDICINE | Admitting: EMERGENCY MEDICINE
Payer: MEDICAID

## 2024-06-08 VITALS
OXYGEN SATURATION: 99 % | HEIGHT: 71 IN | SYSTOLIC BLOOD PRESSURE: 132 MMHG | TEMPERATURE: 98 F | WEIGHT: 169.09 LBS | DIASTOLIC BLOOD PRESSURE: 89 MMHG | HEART RATE: 118 BPM | RESPIRATION RATE: 16 BRPM

## 2024-06-08 VITALS
SYSTOLIC BLOOD PRESSURE: 136 MMHG | OXYGEN SATURATION: 99 % | HEART RATE: 88 BPM | DIASTOLIC BLOOD PRESSURE: 88 MMHG | RESPIRATION RATE: 16 BRPM

## 2024-06-08 DIAGNOSIS — Z98.890 OTHER SPECIFIED POSTPROCEDURAL STATES: Chronic | ICD-10-CM

## 2024-06-08 PROCEDURE — 99285 EMERGENCY DEPT VISIT HI MDM: CPT

## 2024-06-08 PROCEDURE — 99283 EMERGENCY DEPT VISIT LOW MDM: CPT

## 2024-06-08 NOTE — ED PROVIDER NOTE - NSFOLLOWUPINSTRUCTIONS_ED_ALL_ED_FT
Opioid Overdose  Opioids are drugs that are often used to treat pain. Opioids include illegal drugs, such as heroin, as well as prescription pain medicines, such as codeine, morphine, hydrocodone, and fentanyl.    An opioid overdose happens when you take too much of an opioid. An overdose may be intentional or accidental and can happen with any type of opioid.    The effects of an overdose can be mild, dangerous, or even deadly. Opioid overdose is a medical emergency.    What are the causes?  This condition may be caused by:  Taking too much of an opioid on purpose.  Taking too much of an opioid by accident.  Using two or more substances that contain opioids at the same time.  Taking an opioid with a substance that affects your heart, breathing, or blood pressure. These include alcohol, tranquilizers, sleeping pills, illegal drugs, and some over-the-counter medicines.  This condition may also happen due to an error made by:  A health care provider who prescribes a medicine.  The pharmacist who fills the prescription.  What increases the risk?  This condition is more likely in:  Children. They may be attracted to colorful pills. Because of a child's small size, even a small amount of a medicine can be dangerous.  Older people. They may be taking many different medicines. Older people may have difficulty reading labels or remembering when they last took their medicines. They may also be more sensitive to the effects of opioids.  People with chronic medical conditions, especially heart, liver, kidney, or neurological diseases.  People who take an opioid for a long period of time.  People who take opioids and use illegal drugs, such as heroin, or other substances, such as alcohol.  People who:  Have a history of drug or alcohol abuse.  Have certain mental health conditions.  Have a history of previous drug overdoses.  People who take opioids that are not prescribed for them.  What are the signs or symptoms?  Symptoms of this condition depend on the type of opioid and the amount that was taken. Common symptoms include:  Sleepiness or difficulty waking from sleep.  Confusion.  Slurred speech.  Slowed breathing and a slow pulse (bradycardia).  Nausea and vomiting.  Abnormally small pupils.  Signs and symptoms that require emergency treatment include:  Cold, clammy, and pale skin.  Blue lips and fingernails.  Vomiting.  Gurgling sounds in the throat.  A pulse that is very slow or difficult to detect.  Breathing that is very irregular, slow, noisy, or difficult to detect.  Inability to respond to speech or be awakened from sleep (stupor).  Seizures.  How is this diagnosed?  This condition is diagnosed based on your symptoms and medical history. It is important to tell your health care provider:  About all of the opioids that you took.  When you took the opioids.  Whether you were drinking alcohol or using marijuana, cocaine, or other drugs.  Your health care provider will do a physical exam. This exam may include:  Checking and monitoring your heart rate and rhythm, breathing rate, temperature, and blood pressure.  Measuring oxygen levels in your blood.  Checking for abnormally small pupils.  You may also have blood tests or urine tests. You may have X-rays if you are having severe breathing problems.    How is this treated?  This condition requires immediate medical treatment and hospitalization. Reversing the effects of the opioid is the first step in treatment. If you have a Narcan kit or naloxone, use it right away. Follow your health care provider's instructions. A friend or family member can also help you with this.    The rest of your treatment will be given in the hospital intensive care (ICU). Treatment in the hospital may include:  Giving salts and minerals (electrolytes) along with fluids through an IV.  Inserting a breathing tube (endotracheal tube) in your airway to help you breathe if you cannot breathe on your own or you are in danger of not being able to breathe on your own.  Giving oxygen through a small tube under your nose.  Passing a tube through your nose and into your stomach (nasogastric tube, or NG tube) to empty your stomach.  Giving medicines that:  Increase your blood pressure.  Relieve nausea and vomiting.  Relieve abdominal pain and cramping.  Reverse the effects of the opioid (naloxone).  Monitoring your heart and oxygen levels.  Ongoing counseling and mental health support if you intentionally overdosed or used an illegal drug.  Follow these instructions at home:  Three cups showing dark yellow, yellow, and pale yellow urine.  Medicines    Take over-the-counter and prescription medicines only as told by your health care provider.  Always ask your health care provider about possible side effects and interactions of any new medicine that you start taking.  Keep a list of all the medicines that you take, including over-the-counter medicines. Bring this list with you to all your medical visits.  General instructions    Drink enough fluid to keep your urine pale yellow.  Keep all follow-up visits. This is important.  How is this prevented?  Read the drug inserts that come with your opioid pain medicines.  Take medicines only as told by your health care provider. Do not take more medicine than you are told. Do not take medicines more frequently than you are told.  Do not drink alcohol or take sedatives when taking opioids.  Do not use illegal or recreational drugs, including cocaine, ecstasy, and marijuana.  Do not take opioid medicines that are not prescribed for you.  Store all medicines in safety containers that are out of the reach of children.  Get help if you are struggling with:  Alcohol or drug use.  Depression or another mental health problem.  Thoughts of hurting yourself or another person.  Keep the phone number of your local poison control center near your phone or in your mobile phone. In the [place] the hotline of the National Poison Control Center is (393) 013-8602.  If you were prescribed naloxone, make sure you understand how to take it.  Contact a health care provider if:  You need help understanding how to take your pain medicines.  You feel your medicines are too strong.  You are concerned that your pain medicines are not working well for your pain.  You develop new symptoms or side effects when you are taking medicines.  Get help right away if:  You or someone else is having symptoms of an opioid overdose. Get help even if you are not sure.  You have thoughts about hurting yourself or others.  You have:  Chest pain.  Difficulty breathing.  A loss of consciousness.  These symptoms may represent a serious problem that is an emergency. Do not wait to see if the symptoms will go away. Get medical help right away. Call your local emergency services (316 in the [place] Do not drive yourself to the hospital.    If you ever feel like you may hurt yourself or others, or have thoughts about taking your own life, get help right away. You can go to your nearest emergency department or:  Call your local emergency services (610 in the [place]  Call a suicide crisis helpline, such as the National Suicide Prevention Lifeline at 1-570.368.9588 or 334 in the [place] This is open 24 hours a day in the [place]  Text the Crisis Text Line at 716133 (in the [place]  Summary  Opioids are drugs that are often used to treat pain. Opioids include illegal drugs, such as heroin, as well as prescription pain medicines.  An opioid overdose happens when you take too much of an opioid.  Overdoses can be intentional or accidental.  Opioid overdose is very dangerous. It is a life-threatening emergency.  If you or someone you know is experiencing an opioid overdose, get help right away.  This information is not intended to replace advice given to you by your health care provider. Make sure you discuss any questions you have with your health care provider.

## 2024-06-08 NOTE — ED PROVIDER NOTE - NSFOLLOWUPCLINICS_GEN_ALL_ED_FT
[facility] Dept of Substance Abuse  Psychiatry Substance Abuse  [address] [place] [address]  Phone: [phone]  Fax:   Follow Up Time: Routine

## 2024-06-08 NOTE — ED PROVIDER NOTE - NSICDXPASTMEDICALHX_GEN_ALL_CORE_FT
PAST MEDICAL HISTORY:  Alcohol abuse     Benign neoplasm of brain, unspecified brain region     Heroin abuse     Seizures     Staph infection

## 2024-06-08 NOTE — ED ADULT TRIAGE NOTE - ESI TRIAGE ACUITY LEVEL, MLM
----- Message from Trupti Fernández DO sent at 8/8/2023  5:01 PM CDT -----  Please inform patient of normal CMP and stable CBC. Chronic stable mild neutropenia. Will continue to check labs annually.  
MyChart uploaded with providers results and recommendations.     
3

## 2024-06-08 NOTE — ED PROVIDER NOTE - CLINICAL SUMMARY MEDICAL DECISION MAKING FREE TEXT BOX
s/p OD heroin, regained consciousness with narcan administered by EMS, at this time alert, no signs withdrawal, will observe

## 2024-06-08 NOTE — ED PROVIDER NOTE - OBJECTIVE STATEMENT
38y M with h/o substance abuse BIB EMS s/p heroin OD and narcan reversal - pt does not use regularly, snorted heroin today, thinks it was heroin, then pt passed out in his friend's car, friend drove to a firehouse where EMS gave narcan 4mg IN, pt regained consciousness, at this time pt without complaint, denies other coingestants

## 2024-06-08 NOTE — ED PROVIDER NOTE - PATIENT PORTAL LINK FT
You can access the FollowMyHealth Patient Portal offered by [facility] by registering at the following website: [email or web address]. By joining [employer]’s FollowMyHealth portal, you will also be able to view your health information using other applications (apps) compatible with our system.

## 2024-06-09 PROBLEM — F10.10 ALCOHOL ABUSE, UNCOMPLICATED: Chronic | Status: ACTIVE | Noted: 2022-09-19
